# Patient Record
Sex: MALE | Race: WHITE
[De-identification: names, ages, dates, MRNs, and addresses within clinical notes are randomized per-mention and may not be internally consistent; named-entity substitution may affect disease eponyms.]

---

## 2017-08-22 ENCOUNTER — HOSPITAL ENCOUNTER (OUTPATIENT)
Dept: HOSPITAL 62 - END | Age: 72
Discharge: HOME | End: 2017-08-22
Attending: SURGERY
Payer: MEDICARE

## 2017-08-22 VITALS — SYSTOLIC BLOOD PRESSURE: 122 MMHG | DIASTOLIC BLOOD PRESSURE: 70 MMHG

## 2017-08-22 DIAGNOSIS — Z79.899: ICD-10-CM

## 2017-08-22 DIAGNOSIS — Z12.11: Primary | ICD-10-CM

## 2017-08-22 DIAGNOSIS — E11.9: ICD-10-CM

## 2017-08-22 DIAGNOSIS — K57.30: ICD-10-CM

## 2017-08-22 DIAGNOSIS — M19.90: ICD-10-CM

## 2017-08-22 DIAGNOSIS — Z87.891: ICD-10-CM

## 2017-08-22 DIAGNOSIS — Z79.82: ICD-10-CM

## 2017-08-22 DIAGNOSIS — K63.5: ICD-10-CM

## 2017-08-22 DIAGNOSIS — J45.909: ICD-10-CM

## 2017-08-22 DIAGNOSIS — Z79.84: ICD-10-CM

## 2017-08-22 DIAGNOSIS — G47.30: ICD-10-CM

## 2017-08-22 DIAGNOSIS — E78.00: ICD-10-CM

## 2017-08-22 DIAGNOSIS — I10: ICD-10-CM

## 2017-08-22 DIAGNOSIS — K21.9: ICD-10-CM

## 2017-08-22 LAB
ERYTHROCYTE [DISTWIDTH] IN BLOOD BY AUTOMATED COUNT: 13.1 % (ref 11.5–14)
HCT VFR BLD CALC: 39.8 % (ref 37.9–51)
HGB BLD-MCNC: 13.5 G/DL (ref 13.5–17)
HGB HCT DIFFERENCE: 0.7
MCH RBC QN AUTO: 29.6 PG (ref 27–33.4)
MCHC RBC AUTO-ENTMCNC: 33.9 G/DL (ref 32–36)
MCV RBC AUTO: 87 FL (ref 80–97)
RBC # BLD AUTO: 4.57 10^6/UL (ref 4.35–5.55)
WBC # BLD AUTO: 8.7 10^3/UL (ref 4–10.5)

## 2017-08-22 PROCEDURE — 0DBK8ZX EXCISION OF ASCENDING COLON, VIA NATURAL OR ARTIFICIAL OPENING ENDOSCOPIC, DIAGNOSTIC: ICD-10-PCS | Performed by: SURGERY

## 2017-08-22 PROCEDURE — 82962 GLUCOSE BLOOD TEST: CPT

## 2017-08-22 PROCEDURE — 85027 COMPLETE CBC AUTOMATED: CPT

## 2017-08-22 PROCEDURE — 45385 COLONOSCOPY W/LESION REMOVAL: CPT

## 2017-08-22 PROCEDURE — 36415 COLL VENOUS BLD VENIPUNCTURE: CPT

## 2017-08-22 RX ADMIN — FENTANYL CITRATE ONE MCG: 50 INJECTION INTRAMUSCULAR; INTRAVENOUS at 09:18

## 2017-08-22 RX ADMIN — FENTANYL CITRATE ONE MCG: 50 INJECTION INTRAMUSCULAR; INTRAVENOUS at 09:32

## 2017-08-22 RX ADMIN — FENTANYL CITRATE ONE MCG: 50 INJECTION INTRAMUSCULAR; INTRAVENOUS at 08:24

## 2017-08-22 NOTE — OPERATIVE REPORT
Operative Report


DATE OF SURGERY: 08/22/17


PREOPERATIVE DIAGNOSIS: History of colon polyp


POSTOPERATIVE DIAGNOSIS: Hepatic flexure polyp.  Diverticulosis of the colon.


OPERATION: Colonoscopy with snare polypectomy of hepatic flexure polyp.


SURGEON: LIUDMILA ROSA


ANESTHESIA: Moderate Sedation


TISSUE REMOVED OR ALTERED: Hepatic flexure polyp however specimen unable to be 

retrieved.


COMPLICATIONS: 





None


ESTIMATED BLOOD LOSS: Minimal


INTRAOPERATIVE FINDINGS: 1/3 cm sessile polyp at the hepatic flexure.


PROCEDURE: 


Informed consent was obtained.  Patient was brought to the endoscopy suite.  IV 

sedation with Versed and fentanyl was administered.  Digital rectal exam 

revealed no palpable perianal masses.  Endoscope was passed via the patient's 

anus it was fed to the cecum.  Patient's sigmoid colon was markedly redundant 

with huge diverticuli making the procedure difficult however the bowel prep was 

good and visualization was good.  The cecum appeared normal.  The right colon 

appeared normal with the exception at the hepatic flexure where there was a 1/3 

cm sessile polyp tucked behind a fold.  This polyp was extremely difficult to 

do a polypectomy due to its location however snare was able to be placed and 

polypectomy was performed. the polyp remnant appeared blanched consistent with 

cautery destruction of it.  The portion of the polyp that was removed with the 

snare could not be retrieved despite looking around this area multiple times.  

Remainder of the transverse colon appeared normal.  As did the descending 

colon.  Multiple large sigmoid diverticuli were noted in that no other 

abnormalities in the sigmoid colon.  The rectum appeared normal.  Patient 

tolerated procedure well with no apparent complications.





Assessment: Status post successful snare polypectomy of the hepatic flexure 

polyp.  It was destroyed but I was unable to retrieve the specimen.  Recommend 

repeat colonoscopy in 3 years.

## 2017-08-22 NOTE — PDOC DISCHARGE SUMMARY
Discharge Summary (SDC)





- Discharge


Final Diagnosis: 





Hepatic flexure polyp.  Sigmoid diverticulosis.


Date of Surgery: 08/22/17


Discharge Date: 08/22/17


Condition: Good


Treatment or Instructions: 


Colonoscopy with snare polypectomy of hepatic flexure polyp.  May discharge 

patient home when met discharge criteria.  Follow-up with me in 2-3 weeks.


Referrals: 


RADHA CROCKETT MD [Primary Care Provider] - 


Discharge Diet: As Tolerated


Discharge Activity: Activity As Tolerated


Report the Following to Your Physician Immediately: Increase in Pain, Fever 

over 101 Degrees, Unusual Bleeding

## 2018-06-15 ENCOUNTER — HOSPITAL ENCOUNTER (EMERGENCY)
Dept: HOSPITAL 62 - ER | Age: 73
Discharge: HOME | End: 2018-06-15
Payer: MEDICARE

## 2018-06-15 VITALS — SYSTOLIC BLOOD PRESSURE: 157 MMHG | DIASTOLIC BLOOD PRESSURE: 78 MMHG

## 2018-06-15 DIAGNOSIS — Z98.890: ICD-10-CM

## 2018-06-15 DIAGNOSIS — R10.32: ICD-10-CM

## 2018-06-15 DIAGNOSIS — Z87.891: ICD-10-CM

## 2018-06-15 DIAGNOSIS — K57.92: Primary | ICD-10-CM

## 2018-06-15 DIAGNOSIS — E11.9: ICD-10-CM

## 2018-06-15 DIAGNOSIS — J44.9: ICD-10-CM

## 2018-06-15 LAB
ADD MANUAL DIFF: NO
ALBUMIN SERPL-MCNC: 4.1 G/DL (ref 3.5–5)
ALP SERPL-CCNC: 62 U/L (ref 38–126)
ALT SERPL-CCNC: 26 U/L (ref 21–72)
ANION GAP SERPL CALC-SCNC: 12 MMOL/L (ref 5–19)
APPEARANCE UR: CLEAR
APTT PPP: YELLOW S
AST SERPL-CCNC: 23 U/L (ref 17–59)
BASOPHILS # BLD AUTO: 0 10^3/UL (ref 0–0.2)
BASOPHILS NFR BLD AUTO: 0.6 % (ref 0–2)
BILIRUB DIRECT SERPL-MCNC: 0.3 MG/DL (ref 0–0.4)
BILIRUB SERPL-MCNC: 0.3 MG/DL (ref 0.2–1.3)
BILIRUB UR QL STRIP: NEGATIVE
BUN SERPL-MCNC: 21 MG/DL (ref 7–20)
CALCIUM: 9.8 MG/DL (ref 8.4–10.2)
CHLORIDE SERPL-SCNC: 103 MMOL/L (ref 98–107)
CO2 SERPL-SCNC: 29 MMOL/L (ref 22–30)
EOSINOPHIL # BLD AUTO: 0.1 10^3/UL (ref 0–0.6)
EOSINOPHIL NFR BLD AUTO: 1.5 % (ref 0–6)
ERYTHROCYTE [DISTWIDTH] IN BLOOD BY AUTOMATED COUNT: 12.8 % (ref 11.5–14)
GLUCOSE SERPL-MCNC: 229 MG/DL (ref 75–110)
GLUCOSE UR STRIP-MCNC: >=500 MG/DL
HCT VFR BLD CALC: 39.4 % (ref 37.9–51)
HGB BLD-MCNC: 13.3 G/DL (ref 13.5–17)
KETONES UR STRIP-MCNC: NEGATIVE MG/DL
LIPASE SERPL-CCNC: 70.8 U/L (ref 23–300)
LYMPHOCYTES # BLD AUTO: 1.9 10^3/UL (ref 0.5–4.7)
LYMPHOCYTES NFR BLD AUTO: 28.6 % (ref 13–45)
MCH RBC QN AUTO: 29.3 PG (ref 27–33.4)
MCHC RBC AUTO-ENTMCNC: 33.7 G/DL (ref 32–36)
MCV RBC AUTO: 87 FL (ref 80–97)
MONOCYTES # BLD AUTO: 0.6 10^3/UL (ref 0.1–1.4)
MONOCYTES NFR BLD AUTO: 8.6 % (ref 3–13)
NEUTROPHILS # BLD AUTO: 4 10^3/UL (ref 1.7–8.2)
NEUTS SEG NFR BLD AUTO: 60.7 % (ref 42–78)
NITRITE UR QL STRIP: NEGATIVE
PH UR STRIP: 6 [PH] (ref 5–9)
PLATELET # BLD: 258 10^3/UL (ref 150–450)
POTASSIUM SERPL-SCNC: 4.2 MMOL/L (ref 3.6–5)
PROT SERPL-MCNC: 7.1 G/DL (ref 6.3–8.2)
PROT UR STRIP-MCNC: NEGATIVE MG/DL
RBC # BLD AUTO: 4.54 10^6/UL (ref 4.35–5.55)
SODIUM SERPL-SCNC: 143.8 MMOL/L (ref 137–145)
SP GR UR STRIP: 1.02
TOTAL CELLS COUNTED % (AUTO): 100 %
UROBILINOGEN UR-MCNC: 2 MG/DL (ref ?–2)
WBC # BLD AUTO: 6.7 10^3/UL (ref 4–10.5)

## 2018-06-15 PROCEDURE — 83690 ASSAY OF LIPASE: CPT

## 2018-06-15 PROCEDURE — 36415 COLL VENOUS BLD VENIPUNCTURE: CPT

## 2018-06-15 PROCEDURE — 80053 COMPREHEN METABOLIC PANEL: CPT

## 2018-06-15 PROCEDURE — 85025 COMPLETE CBC W/AUTO DIFF WBC: CPT

## 2018-06-15 PROCEDURE — 99284 EMERGENCY DEPT VISIT MOD MDM: CPT

## 2018-06-15 PROCEDURE — 81001 URINALYSIS AUTO W/SCOPE: CPT

## 2018-06-15 PROCEDURE — 74177 CT ABD & PELVIS W/CONTRAST: CPT

## 2018-06-15 NOTE — ER DOCUMENT REPORT
ED Medical Screen (RME)





- General


Chief Complaint: Abdominal Pain


Stated Complaint: ABDOMINAL PAIN


Time Seen by Provider: 06/15/18 16:29


Notes: 





RAPID MEDICAL EVALUATION DISCLOSURE


I have seen this patient as part of a Rapid Medical Evaluation and, if 

applicable, placed any initially appropriate orders. The patient will be seen 

and fully evaluated, including a full history and physical exam, by a provider (

in Main ED or Fast Track) when a room becomes available.





------------------------------------------------------------------





72-year-old male PMH diverticulosis here with complaints of left lower quadrant 

abdominal pain ongoing for the past 2 days.  He has not taken anything for the 

pain.  He does not have any associated nausea vomiting diarrhea hematuria 

frequency hesitancy back pain fevers chills.  He has never had diverticulitis 

colitis.





EXAM


Mild to moderate left lower quadrant TTP but not elsewhere


No peritoneal signs








TRAVEL OUTSIDE OF THE [place] IN LAST 30 DAYS: No





- Related Data


Allergies/Adverse Reactions: 


 





No Known Allergies Allergy (Verified 06/15/18 16:05)


 











Past Medical History





- Past Medical History


Cardiac Medical History: Reports: Hx Hypercholesterolemia


   Denies: Hx Coronary Artery Disease, Hx Heart Attack, Hx Hypertension


Pulmonary Medical History: Reports: Hx COPD - reactive airway disease


   Denies: Hx Asthma, Hx Bronchitis, Hx Pneumonia


Neurological Medical History: Denies: Hx Cerebrovascular Accident, Hx Seizures


Endocrine Medical History: Reports: Hx Diabetes Mellitus Type 2


GI Medical History: Denies: Hx Hepatitis, Hx Hiatal Hernia, Hx Ulcer


Musculoskeltal Medical History: Reports Hx Arthritis - osteoarthrits


Infectious Medical History: Denies: Hx Hepatitis


Past Surgical History: Reports: Hx Orthopedic Surgery - left total knee 

replacement.  Denies: Hx Open Heart Surgery, Hx Pacemaker





- Immunizations


Hx Diphtheria, Pertussis, Tetanus Vaccination: No





Physical Exam





- Vital signs


Vitals: 





 











Temp Pulse Resp BP Pulse Ox


 


 98.0 F   80   16   150/78 H  95 


 


 06/15/18 16:13  06/15/18 16:13  06/15/18 16:13  06/15/18 16:13  06/15/18 16:13














Course





- Vital Signs


Vital signs: 





 











Temp Pulse Resp BP Pulse Ox


 


 98.0 F   80   16   150/78 H  95 


 


 06/15/18 16:13  06/15/18 16:13  06/15/18 16:13  06/15/18 16:13  06/15/18 16:13














Doctor's Discharge





- Discharge


Referrals: 


RADHA CROCKETT MD [Primary Care Provider] - Follow up as needed

## 2018-06-15 NOTE — ER DOCUMENT REPORT
ED GI/





- General


Chief Complaint: Abdominal Pain


Stated Complaint: ABDOMINAL PAIN


Time Seen by Provider: 06/15/18 16:29


Notes: 


Patient is a 72-year-old male, past medical history hemicolectomy, 

diverticulosis, presents with 3 days of worsening left lower quadrant abdominal 

pain that is worse with movement.  He denies fevers, nausea, vomiting, blood in 

his stool, diarrhea, constipation or urinary symptoms.


TRAVEL OUTSIDE OF THE [place] IN LAST 30 DAYS: No





- Related Data


Allergies/Adverse Reactions: 


 





No Known Allergies Allergy (Verified 06/15/18 16:05)


 











Past Medical History





- General


Information source: Patient





- Social History


Smoking Status: Former Smoker


Frequency of alcohol use: None


Drug Abuse: None


Family History: Reviewed & Not Pertinent


Patient has suicidal ideation: No


Patient has homicidal ideation: No





- Past Medical History


Cardiac Medical History: Reports: Hx Hypercholesterolemia


   Denies: Hx Coronary Artery Disease, Hx Heart Attack, Hx Hypertension


Pulmonary Medical History: Reports: Hx COPD - reactive airway disease


   Denies: Hx Asthma, Hx Bronchitis, Hx Pneumonia


Neurological Medical History: Denies: Hx Cerebrovascular Accident, Hx Seizures


Endocrine Medical History: Reports: Hx Diabetes Mellitus Type 2


Renal/ Medical History: Denies: Hx Peritoneal Dialysis


GI Medical History: Denies: Hx Hepatitis, Hx Hiatal Hernia, Hx Ulcer


Musculoskeltal Medical History: Reports Hx Arthritis - osteoarthrits


Infectious Medical History: Denies: Hx Hepatitis


Past Surgical History: Reports: Hx Orthopedic Surgery - left total knee 

replacement.  Denies: Hx Open Heart Surgery, Hx Pacemaker





- Immunizations


Hx Diphtheria, Pertussis, Tetanus Vaccination: No


Hx Pneumococcal Vaccination: 01/01/10





Review of Systems





- Review of Systems


Notes: 


REVIEW OF SYSTEMS:


CONSTITUTIONAL: -fevers, -chills


EENT: -eye pain, -difficulty swallowing, -nasal congestion


CARDIOVASCULAR: -chest pain, -syncope.


RESPIRATORY: -cough, -SOB


GASTROINTESTINAL: +LLQ abdominal pain, -nausea, -vomiting, -diarrhea


GENITOURINARY: -dysuria, -hematuria


MUSCULOSKELETAL: -back pain, -neck pain


SKIN: -rash or skin lesions.


HEMATOLOGIC: -easy bruising or bleeding.


LYMPHATIC: -swollen, enlarged glands.


NEUROLOGICAL: -altered mental status or loss of consciousness, -headache, -

neurologic symptoms


PSYCHIATRIC: -anxiety, -depression.


ALL OTHER SYSTEMS REVIEWED AND NEGATIVE.





Physical Exam





- Vital signs


Vitals: 


 











Temp Pulse Resp BP Pulse Ox


 


 98.0 F   80   16   150/78 H  95 


 


 06/15/18 16:13  06/15/18 16:13  06/15/18 16:13  06/15/18 16:13  06/15/18 16:13














- Notes


Notes: 


PHYSICAL EXAMINATION:


GENERAL: Well-appearing, well-nourished and in no acute distress.


HEAD: Atraumatic, normocephalic.


EYES: Pupils equal round and reactive to light, extraocular movements intact, 

sclera anicteric, conjunctiva are normal.


ENT: nares patent, oropharynx clear without exudates.  Moist mucous membranes.


NECK: Normal range of motion, supple without lymphadenopathy


LUNGS: Breath sounds clear to auscultation bilaterally and equal.  No wheezes 

rales or rhonchi.


HEART: Regular rate and rhythm without murmurs


ABDOMEN: Soft, moderate LLQ tenderness, normoactive bowel sounds.  No guarding, 

no rebound.  No masses appreciated.


EXTREMITIES: Normal range of motion, no pitting or edema.  No cyanosis.


NEUROLOGICAL: Cranial nerves grossly intact.  Normal speech, normal gait.  

Normal sensory and motor exams.


PSYCH: Normal mood, normal affect.


SKIN: Warm, Dry, normal turgor, no rashes or lesions noted.





Course





- Re-evaluation


Re-evalutation: 


Patient appears very well.  He has mild diverticulitis, but no fluid collection 

or other complications.  With his well appearance and controlled pain, will 

treat him as an outpatient with antibiotics and very strict return precautions.

  He will follow-up with his primary care physician and GI doctor.





- Vital Signs


Vital signs: 


 











Temp Pulse Resp BP Pulse Ox


 


 98.0 F   80   18   150/78 H  95 


 


 06/15/18 16:13  06/15/18 16:13  06/15/18 17:08  06/15/18 16:13  06/15/18 16:13














- Laboratory


Result Diagrams: 


 06/15/18 16:51





 06/15/18 16:51


Laboratory results interpreted by me: 


 











  06/15/18 06/15/18 06/15/18





  16:41 16:51 16:51


 


Hgb   13.3 L 


 


BUN    21 H


 


Glucose    229 H


 


Urine Glucose (UA)  >=500 H  


 


Urine Urobilinogen  2.0 H  


 


Urine Ascorbic Acid  40 H  














- Diagnostic Test


Radiology reviewed: Image reviewed, Reports reviewed


Radiology results interpreted by me: 


CT A/P: Minimal fat stranding in the lower descending colon, possible early 

diverticulitis. No fluid collection.





Discharge





- Discharge


Clinical Impression: 


 Diverticulitis





Condition: Stable


Disposition: HOME, SELF-CARE


Additional Instructions: 


Diverticulitis





     You have been diagnosed as having diverticulitis.  This is an inflammation 

of a small pouch attached to the colon, called a diverticulum.  Many of these 

small pouches can form on the colon as you get older.  They are often caused by 

constipation.  When inflamed or infected, symptoms arise -- usually abdominal 

pain, constipation or diarrhea, fever, and blood in the stool.


     Severe diverticulitis may require hospitalization.  More mild cases are 

usually treated with antibiotics and clear liquid diet.  As you improve, a diet 

low in residue (one which forms little stool) is prescribed.


     When you are better, you should eat a high-fiber diet.  Stool softeners (

like Metamucil) are usually recommended.


     Call the doctor or go to the hospital if there is increasing pain, vomiting

, high fever, large amounts of blood passed, or if bowel movements cease.





Prescriptions: 


Ciprofloxacin HCl [Cipro 500 mg Tablet] 500 mg PO BID #20 tablet


Metronidazole [Flagyl 500 mg Tablet] 500 mg PO Q8H #21 tablet


Forms:  Elevated Blood Pressure


Referrals: 


RADHA CROCKETT MD [Primary Care Provider] - Follow up as needed

## 2018-06-15 NOTE — RADIOLOGY REPORT (SQ)
EXAM DESCRIPTION:  CT ABD/PELVIS WITH IV   ORAL



COMPLETED DATE/TIME:  6/15/2018 7:58 pm



REASON FOR STUDY:  LLQ tenderness



COMPARISON:  None.



TECHNIQUE:  CT scan of the abdomen and pelvis performed using helical scanning technique with dynamic
 intravenous contrast injection.  No oral contrast. Images reviewed with lung, soft tissue, and bone 
windows. Reconstructed coronal and sagittal MPR images reviewed. Delayed images for evaluation of the
 urinary system also acquired. All images stored on PACS.

All CT scanners at this facility use dose modulation, iterative reconstruction, and/or weight based d
osing when appropriate to reduce radiation dose to as low as reasonably achievable (ALARA).

[facility]: Dose Right  [facility]: CareDose    [facility]: Dose Right    [facility]: Teradose 4D    [facility]: Smart Technologies



CONTRAST TYPE AND DOSE:  contrast/concentration: Isovue 370.00 mg/ml; Total Contrast Delivered: 88.0 
ml; Total Saline Delivered: 69.0 ml



RENAL FUNCTION:  GFR > 60.



RADIATION DOSE:  CT Rad equipment meets quality standard of care and radiation dose reduction techniq
ues were employed. CTDIvol: 13.0 - 16.0 mGy. DLP: 1529 mGy-cm..



LIMITATIONS:  None.



FINDINGS:  LOWER CHEST: No significant findings. No nodules or infiltrates.

LIVER: Normal size. No masses.  No dilated ducts.

SPLEEN: Normal size. No focal lesions.

PANCREAS: No masses. No significant calcifications. No adjacent inflammation or peripancreatic fluid 
collections. Pancreatic duct not dilated.

GALLBLADDER: No identified stones by CT criteria. No inflammatory changes to suggest cholecystitis.

ADRENAL GLANDS: No significant masses or asymmetry.

RIGHT KIDNEY AND URETER: No solid masses.   No significant calcifications.   No hydronephrosis or hyd
roureter.

LEFT KIDNEY AND URETER: No solid masses.   No significant calcifications.   No hydronephrosis or hydr
oureter.

AORTA AND VESSELS: No aneurysm. No dissection. Renal arteries, SMA, celiac without stenosis.

RETROPERITONEUM: No retroperitoneal adenopathy, hemorrhage or masses.

BOWEL AND PERITONEAL CAVITY: Diverticulosis.  Minimal fat stranding in the lower descending colon, po
ssible early diverticulitis.  No fluid collection.

APPENDIX: Normal.

PELVIS: No mass.  No free fluid. Normal bladder.

ABDOMINAL WALL: No masses. No hernias.

BONES: No acute findings.

OTHER: No other significant finding.



IMPRESSION:  Minimal fat stranding in the lower descending colon, possible early diverticulitis.  No 
fluid collection.



TECHNICAL DOCUMENTATION:  JOB ID:  [id number]

[id number]

Quality ID # [id number]: Final reports with documentation of one or more dose reduction techniques (e.g., Au
tomated exposure control, adjustment of the mA and/or kV according to patient size, use of iterative 
reconstruction technique)

 2011 [employer]- All Rights Reserved



Reading location - IP/workstation name: [email or web address]